# Patient Record
Sex: FEMALE | Race: WHITE | NOT HISPANIC OR LATINO | URBAN - METROPOLITAN AREA
[De-identification: names, ages, dates, MRNs, and addresses within clinical notes are randomized per-mention and may not be internally consistent; named-entity substitution may affect disease eponyms.]

---

## 2018-08-16 ENCOUNTER — TELEPHONE (OUTPATIENT)
Dept: NEUROSURGERY | Facility: CLINIC | Age: 33
End: 2018-08-16

## 2018-08-16 NOTE — TELEPHONE ENCOUNTER
Patient due for 3Y recall, hx of SAH, emb/aneurysm 2015. Pre-angio visit. Old records in chart.     She lives in [facility] now and needs to arrange plane trip - so visits need to be coordinated (pre-angio, sched angio, PO visit) all in the same week. Address updated. Her mom lives here locally (see emergency contact info).     Appt made for Monday, Oct 8 at 9:30. (will alert Rik of need for this coordination).     Also she has a new insurance (one I've not heard of...) that we need to make sure is accepted by [facility] and our providers. ( I will have Julia investigate this first). If we don't accept or hospital doesn't accept, then patient must be notified ASAP (to cancel travel plans).     Namita Carmona [phone]  ID # [id number]  (no group ID)  Effective 1-1-18

## 2018-08-17 NOTE — TELEPHONE ENCOUNTER
According to the list provided by Raj, [employer] is limited to only those practices with operations in [place].  This could mean that Dr Esteban Campos could assume care possibly?  [facility] and our practice would not accept her new insurance.

## 2018-08-17 NOTE — TELEPHONE ENCOUNTER
"Patient informed. She is going to look for a neurosurgeon in her area. When located, I told her we will be more than happy to provide medical records/CD with imaging, but this is a condition she will need to have followed \"for life\".     She will let us know who she finds.   "